# Patient Record
Sex: MALE
[De-identification: names, ages, dates, MRNs, and addresses within clinical notes are randomized per-mention and may not be internally consistent; named-entity substitution may affect disease eponyms.]

---

## 2022-04-05 PROBLEM — Z00.00 ENCOUNTER FOR PREVENTIVE HEALTH EXAMINATION: Status: ACTIVE | Noted: 2022-04-05

## 2022-04-07 ENCOUNTER — APPOINTMENT (OUTPATIENT)
Dept: ORTHOPEDIC SURGERY | Facility: CLINIC | Age: 47
End: 2022-04-07
Payer: OTHER MISCELLANEOUS

## 2022-04-07 VITALS — HEIGHT: 72 IN | WEIGHT: 175 LBS | BODY MASS INDEX: 23.7 KG/M2

## 2022-04-07 VITALS — BODY MASS INDEX: 23.03 KG/M2 | WEIGHT: 170 LBS | HEIGHT: 72 IN

## 2022-04-07 DIAGNOSIS — Z78.9 OTHER SPECIFIED HEALTH STATUS: ICD-10-CM

## 2022-04-07 DIAGNOSIS — M51.26 OTHER INTERVERTEBRAL DISC DISPLACEMENT, LUMBAR REGION: ICD-10-CM

## 2022-04-07 DIAGNOSIS — M54.16 RADICULOPATHY, LUMBAR REGION: ICD-10-CM

## 2022-04-07 DIAGNOSIS — M51.36 OTHER INTERVERTEBRAL DISC DEGENERATION, LUMBAR REGION: ICD-10-CM

## 2022-04-07 DIAGNOSIS — S39.012D STRAIN OF MUSCLE, FASCIA AND TENDON OF LOWER BACK, SUBSEQUENT ENCOUNTER: ICD-10-CM

## 2022-04-07 PROCEDURE — 99213 OFFICE O/P EST LOW 20 MIN: CPT

## 2022-04-07 PROCEDURE — 99072 ADDL SUPL MATRL&STAF TM PHE: CPT

## 2022-04-07 RX ORDER — IBUPROFEN 600 MG
600 TABLET ORAL
Refills: 0 | Status: ACTIVE | COMMUNITY

## 2022-04-07 RX ORDER — DICLOFENAC SODIUM 75 MG/1
75 TABLET, DELAYED RELEASE ORAL
Refills: 0 | Status: COMPLETED | COMMUNITY

## 2022-04-07 NOTE — PHYSICAL EXAM
[5___] : right hamstring 5[unfilled]/5 [] : no ecchymosis [TWNoteComboBox7] : forward flexion 60 degrees [de-identified] : extension 20 degrees [de-identified] : left lateral bending 20 degrees [de-identified] : right lateral bending 20 degrees

## 2022-04-07 NOTE — HISTORY OF PRESENT ILLNESS
[Lower back] : lower back [6] : 6 [Dull/Aching] : dull/aching [Constant] : constant [Sleep] : sleep [Sitting] : sitting [Lying in bed] : lying in bed [Not working due to injury] : Work status: not working due to injury [de-identified] : WC 12/12/18\par Follow-up today. Symptoms continue. Some days better than others. Occasional radiation to the right glut. Doing HEP. Taking Ibuprofen/Flexeril PRN. OOW. [] : no [de-identified] : none

## 2022-06-01 ENCOUNTER — RX RENEWAL (OUTPATIENT)
Age: 47
End: 2022-06-01

## 2022-06-07 ENCOUNTER — RX RENEWAL (OUTPATIENT)
Age: 47
End: 2022-06-07

## 2022-06-30 ENCOUNTER — APPOINTMENT (OUTPATIENT)
Dept: UROLOGY | Facility: CLINIC | Age: 47
End: 2022-06-30

## 2022-06-30 VITALS
BODY MASS INDEX: 23.03 KG/M2 | OXYGEN SATURATION: 100 % | HEIGHT: 72 IN | SYSTOLIC BLOOD PRESSURE: 136 MMHG | DIASTOLIC BLOOD PRESSURE: 77 MMHG | HEART RATE: 94 BPM | WEIGHT: 170 LBS | TEMPERATURE: 98.7 F

## 2022-06-30 DIAGNOSIS — R39.15 URGENCY OF URINATION: ICD-10-CM

## 2022-06-30 DIAGNOSIS — R39.12 POOR URINARY STREAM: ICD-10-CM

## 2022-06-30 DIAGNOSIS — R35.0 FREQUENCY OF MICTURITION: ICD-10-CM

## 2022-06-30 PROCEDURE — 51798 US URINE CAPACITY MEASURE: CPT

## 2022-06-30 PROCEDURE — 99203 OFFICE O/P NEW LOW 30 MIN: CPT | Mod: 25

## 2022-06-30 RX ORDER — TIZANIDINE 2 MG/1
2 TABLET ORAL
Qty: 30 | Refills: 0 | Status: ACTIVE | COMMUNITY
Start: 2022-06-23

## 2022-06-30 RX ORDER — CYCLOBENZAPRINE HYDROCHLORIDE 10 MG/1
10 TABLET, FILM COATED ORAL
Refills: 0 | Status: DISCONTINUED | COMMUNITY
End: 2022-06-30

## 2022-06-30 NOTE — REVIEW OF SYSTEMS
[Palpitations] : palpitations [Seen by urologist before (Name)  ___] : Preciously seen by a urologist: [unfilled] [Strong urge to urinate] : strong urge to urinate [Bladder pressure] : experiences bladder pressure [Slow urine stream] : slow urine stream [Joint Pain] : joint pain [Joint Swelling] : joint swelling [Limb Pain] : limb pain [Limb Swelling] : limb swelling [Anxiety] : anxiety [Negative] : Heme/Lymph

## 2022-06-30 NOTE — PHYSICAL EXAM
[General Appearance - Well Developed] : well developed [General Appearance - Well Nourished] : well nourished [Normal Appearance] : normal appearance [Well Groomed] : well groomed [General Appearance - In No Acute Distress] : no acute distress [Edema] : no peripheral edema [Respiration, Rhythm And Depth] : normal respiratory rhythm and effort [Exaggerated Use Of Accessory Muscles For Inspiration] : no accessory muscle use [Abdomen Soft] : soft [Abdomen Tenderness] : non-tender [Costovertebral Angle Tenderness] : no ~M costovertebral angle tenderness [Urethral Meatus] : meatus normal [Penis Abnormality] : normal uncircumcised penis [Urinary Bladder Findings] : the bladder was normal on palpation [Scrotum] : the scrotum was normal [Testes Tenderness] : no tenderness of the testes [Testes Mass (___cm)] : there were no testicular masses [No Prostate Nodules] : no prostate nodules [Normal Station and Gait] : the gait and station were normal for the patient's age [] : no rash [No Focal Deficits] : no focal deficits [Oriented To Time, Place, And Person] : oriented to person, place, and time [Affect] : the affect was normal [Mood] : the mood was normal [Not Anxious] : not anxious [No Palpable Adenopathy] : no palpable adenopathy [FreeTextEntry1] : normal testes; no masses

## 2022-06-30 NOTE — ASSESSMENT
[FreeTextEntry1] : Discussed at length, that I would ideally like a flow and PVR at next visit.\par Due to straining, would like a KEVIN and cystoscopy, which I explained in detail. Wanted to r/o hydronephrosis and/or urethral stricture.\par \par Also reviewed urodynamics in detail, as his complaints are significant w IPSS 22, and has also seen other urologists. Mentioned the purpose and information it provides.\par \par HE will think about it and return tomorrow for UA and culture, prior to any instrumentation.\par Asked him to look up new PCP's name so I can request blood work. If no PSA sent, will get at next visit.

## 2022-06-30 NOTE — HISTORY OF PRESENT ILLNESS
[FreeTextEntry1] : DOMINIC LIM is a 46 year old M who presents with significant LUTS: IPSS 22\par States he often has urgency and then can't go to the bathroom; stream is very weak at times and intermittent.\par \par No h/o GH, stones, UTI or STD\par No prior AUR or jacob\par No FH of  anomalies or  cancers; states he was a full term healthy child w no childhood hospitalizations\par \par significant bulging and slipped discs, requiring epidurals in the past; no other medical hx\par \par Has 120 ml in bladder and cannot give specimen: voided 2 hrs ago\par \par Occ he has strong and steady stream, and other times goes and then double voids etc.; also states that he often feels wet underwear bur denies urinary or fecal incontinence.\par \par